# Patient Record
Sex: FEMALE | Race: WHITE | NOT HISPANIC OR LATINO
[De-identification: names, ages, dates, MRNs, and addresses within clinical notes are randomized per-mention and may not be internally consistent; named-entity substitution may affect disease eponyms.]

---

## 2021-05-14 PROBLEM — Z00.00 ENCOUNTER FOR PREVENTIVE HEALTH EXAMINATION: Status: ACTIVE | Noted: 2021-05-14

## 2021-05-20 ENCOUNTER — APPOINTMENT (OUTPATIENT)
Dept: BARIATRICS | Facility: CLINIC | Age: 36
End: 2021-05-20
Payer: COMMERCIAL

## 2021-05-20 VITALS — WEIGHT: 270 LBS | HEIGHT: 68 IN | BODY MASS INDEX: 40.92 KG/M2

## 2021-05-20 DIAGNOSIS — Z87.42 PERSONAL HISTORY OF OTHER DISEASES OF THE FEMALE GENITAL TRACT: ICD-10-CM

## 2021-05-20 DIAGNOSIS — Z85.850 PERSONAL HISTORY OF MALIGNANT NEOPLASM OF THYROID: ICD-10-CM

## 2021-05-20 DIAGNOSIS — Z87.898 PERSONAL HISTORY OF OTHER SPECIFIED CONDITIONS: ICD-10-CM

## 2021-05-20 DIAGNOSIS — Z83.3 FAMILY HISTORY OF DIABETES MELLITUS: ICD-10-CM

## 2021-05-20 DIAGNOSIS — F41.1 GENERALIZED ANXIETY DISORDER: ICD-10-CM

## 2021-05-20 DIAGNOSIS — G43.909 MIGRAINE, UNSPECIFIED, NOT INTRACTABLE, W/OUT STATUS MIGRAINOSUS: ICD-10-CM

## 2021-05-20 PROCEDURE — 99204 OFFICE O/P NEW MOD 45 MIN: CPT | Mod: 95

## 2021-05-20 RX ORDER — NADOLOL 80 MG/1
TABLET ORAL
Refills: 0 | Status: ACTIVE | COMMUNITY

## 2021-05-20 RX ORDER — LEVOTHYROXINE SODIUM 200 UG/1
200 TABLET ORAL
Refills: 0 | Status: ACTIVE | COMMUNITY

## 2021-05-20 RX ORDER — ALPRAZOLAM 2 MG/1
TABLET ORAL
Refills: 0 | Status: ACTIVE | COMMUNITY

## 2021-05-20 RX ORDER — METFORMIN ER 500 MG 500 MG/1
500 TABLET ORAL
Refills: 0 | Status: ACTIVE | COMMUNITY

## 2021-05-20 RX ORDER — PAROXETINE HYDROCHLORIDE 40 MG/1
TABLET, FILM COATED ORAL
Refills: 0 | Status: ACTIVE | COMMUNITY

## 2021-05-20 NOTE — HISTORY OF PRESENT ILLNESS
[FreeTextEntry1] : 36 year old female referral by Dr. Salas.\par Patient has been struggling with her weight for many years.  +PCOS on depo, MADHAV stable on paxil, prediabetes for years failed IR metformin due to GI side effects, s/p total thyroidectomy with GUTIERREZ undetectable thyroglobulin level.\par Two small children- 8 m and 6 yr with special needs\par Works as a  \par Does not cook- relies mostly on takeout and now her mother is helping \par Has recumbent bike at home, goes for walks 3x a week 15-30m\par Good water intake\par Poor sleep due to children and  being up at night\par Established care with Dr. Duarte earlier this week- starting on metformin ER at a low dose discussed possible Saxenda in the future\par Father had bariatric surgery and struggled before his death \par Reports recent HbA1C 6.4%

## 2021-05-20 NOTE — ASSESSMENT
[FreeTextEntry1] : 36 year old female with morbid obesity, pcos and prediabetes\par Discussed importance of low carb diet- reviewed complex carbs vs. simple carbs and healthy plate pattern of eating.  DIscussed 15g snack with protein if truly hungry.  \par Patient to continue metformin and consider GLP-1RA\par Discussed diagnostic criteria for diabetes and likelihood of her developing diabetes if her weight goes up\par Encouraged regular exercise\par Will refer to RD to discuss eating choices and [facility] program for cooking classes if currently offering d/t pandemic.  Advised to start tracking using myfitnesspal or [employer] prior to appointment if she has the time \par Discussed possibility of bariatric surgery in the future if patient does not see improvement with lifestyle changes she is making

## 2021-05-20 NOTE — REASON FOR VISIT
[Home] : at home, [unfilled] , at the time of the visit. [Other Location: e.g. Home (Enter Location, City,State)___] : at [unfilled] [Verbal consent obtained from patient] : the patient, [unfilled] [Initial Consultation] : an initial consultation for [Obesity] : obesity [Polycystic Ovary Syndrome] : polycystic ovary syndrome [FreeTextEntry2] : prediabetes

## 2021-06-17 ENCOUNTER — APPOINTMENT (OUTPATIENT)
Dept: INTERNAL MEDICINE | Facility: CLINIC | Age: 36
End: 2021-06-17
Payer: COMMERCIAL

## 2021-06-17 DIAGNOSIS — R73.03 PREDIABETES.: ICD-10-CM

## 2021-06-17 PROCEDURE — 97802 MEDICAL NUTRITION INDIV IN: CPT | Mod: 95

## 2021-06-17 RX ORDER — TIZANIDINE 4 MG/1
TABLET ORAL
Refills: 0 | Status: ACTIVE | COMMUNITY

## 2021-07-20 ENCOUNTER — APPOINTMENT (OUTPATIENT)
Dept: INTERNAL MEDICINE | Facility: CLINIC | Age: 36
End: 2021-07-20

## 2022-01-05 ENCOUNTER — TRANSCRIPTION ENCOUNTER (OUTPATIENT)
Age: 37
End: 2022-01-05

## 2022-03-10 ENCOUNTER — APPOINTMENT (OUTPATIENT)
Dept: PULMONOLOGY | Facility: CLINIC | Age: 37
End: 2022-03-10
Payer: COMMERCIAL

## 2022-03-10 VITALS
HEART RATE: 75 BPM | HEIGHT: 68 IN | BODY MASS INDEX: 40.92 KG/M2 | WEIGHT: 270 LBS | DIASTOLIC BLOOD PRESSURE: 70 MMHG | SYSTOLIC BLOOD PRESSURE: 118 MMHG

## 2022-03-10 DIAGNOSIS — G47.33 OBSTRUCTIVE SLEEP APNEA (ADULT) (PEDIATRIC): ICD-10-CM

## 2022-03-10 DIAGNOSIS — Z78.9 OTHER SPECIFIED HEALTH STATUS: ICD-10-CM

## 2022-03-10 PROCEDURE — 99203 OFFICE O/P NEW LOW 30 MIN: CPT

## 2022-03-10 RX ORDER — MEDROXYPROGESTERONE ACETATE 400 MG/ML
INJECTION, SUSPENSION INTRAMUSCULAR
Refills: 0 | Status: ACTIVE | COMMUNITY

## 2022-03-10 RX ORDER — ROSUVASTATIN CALCIUM 5 MG/1
TABLET, FILM COATED ORAL
Refills: 0 | Status: ACTIVE | COMMUNITY

## 2022-03-10 NOTE — HISTORY OF PRESENT ILLNESS
[FreeTextEntry1] : Dr. Salas\par 36 year old woman  with history of thyroid cancer in remission, anxiety, migraines is here in the sleep center to address sleep apnea.  Patient is sleepy with [place] sleepiness score of 12.  Patient has very loud snoring, also has witnessed apneas.  Patient's bedtime is around 8 PM wakes up in the morning around 6 AM.  She feels tired when she wakes up.  She is sleepy while driving.\par STOPBANG score - 5\par \par Patient underwent a sleep study which showed moderate sleep apnea with AHI of 20.  Discussed results with the patient today.\par

## 2022-03-10 NOTE — ASSESSMENT
[FreeTextEntry1] : 36 -year-old woman with history of moderate obstructive sleep apnea.\par \par Today we discussed the results in detail with the patient and the first option for this degree of sleep apnea with her anatomy will be a CPAP machine.\par \par But due to practical issues , patient has 2 kids at home who have special needs and because of that patient will not be able to use the CPAP therapy at this time.  Patient is also hesitant to use the CPAP.  She saw the CPAP being used at home by her father in the past.\par \par For now we will use oral appliance and see if it benefits the patient.

## 2022-06-02 ENCOUNTER — RESULT REVIEW (OUTPATIENT)
Age: 37
End: 2022-06-02

## 2023-03-27 ENCOUNTER — APPOINTMENT (OUTPATIENT)
Dept: PEDIATRIC ORTHOPEDIC SURGERY | Facility: CLINIC | Age: 38
End: 2023-03-27
Payer: COMMERCIAL

## 2023-03-27 VITALS — HEIGHT: 68 IN | WEIGHT: 270 LBS | BODY MASS INDEX: 40.92 KG/M2 | TEMPERATURE: 97.6 F

## 2023-03-27 DIAGNOSIS — E66.01 MORBID (SEVERE) OBESITY DUE TO EXCESS CALORIES: ICD-10-CM

## 2023-03-27 PROCEDURE — 20610 DRAIN/INJ JOINT/BURSA W/O US: CPT

## 2023-03-27 PROCEDURE — 99203 OFFICE O/P NEW LOW 30 MIN: CPT | Mod: 25

## 2023-03-27 PROCEDURE — 73521 X-RAY EXAM HIPS BI 2 VIEWS: CPT

## 2023-03-27 RX ORDER — SEMAGLUTIDE 1.34 MG/ML
2 INJECTION, SOLUTION SUBCUTANEOUS
Refills: 0 | Status: ACTIVE | COMMUNITY

## 2023-03-27 NOTE — PROCEDURE
[FreeTextEntry1] : To mL of 1% plain lidocaine and 1 mL of 40 mg of Depo-Medrol have been injected into the right greater trochanter without adverse reaction

## 2023-03-27 NOTE — PHYSICAL EXAM
[de-identified] : On physical examination her gait is normal.  The patient has marked tenderness in the region of the greater trochanter of the right hip.  Attempts at abduction of the hip against resistance increases her hip pain.  Internal rotation especially against resistance causes pain in her right groin. [de-identified] : X-ray evaluation of the hips on 3/27/2023 (AP and frog lateral views) reveals no obvious abnormalities.

## 2023-03-27 NOTE — ASSESSMENT
[FreeTextEntry1] : Rule out articular cartilage injury right hip\par Trochanteric bursitis right hip\par \par It is felt that this patient should try an intra-articular cortisone injection into the right hip under x-ray control.  We are seeking authorization for this procedure.

## 2023-03-27 NOTE — HISTORY OF PRESENT ILLNESS
[de-identified] : This 38-year-old female who is obese is here for evaluation of persistent right groin and right lateral hip pain since an accident she had in January 2022.  Patient has seen a few orthopedic surgeons who made a diagnosis of possible labral tear.  An MRI arthrogram revealed no obvious hip abnormalities.  Patient was sent for physical therapy which did not help at all.  She has come to this office for further opinion.

## 2023-07-06 ENCOUNTER — NON-APPOINTMENT (OUTPATIENT)
Age: 38
End: 2023-07-06

## 2023-07-13 ENCOUNTER — APPOINTMENT (OUTPATIENT)
Dept: PEDIATRIC ORTHOPEDIC SURGERY | Facility: CLINIC | Age: 38
End: 2023-07-13
Payer: COMMERCIAL

## 2023-07-13 VITALS
HEIGHT: 68 IN | BODY MASS INDEX: 40.92 KG/M2 | DIASTOLIC BLOOD PRESSURE: 86 MMHG | WEIGHT: 270 LBS | TEMPERATURE: 96.8 F | SYSTOLIC BLOOD PRESSURE: 119 MMHG

## 2023-07-13 DIAGNOSIS — M25.551 PAIN IN RIGHT HIP: ICD-10-CM

## 2023-07-13 DIAGNOSIS — M70.61 TROCHANTERIC BURSITIS, RIGHT HIP: ICD-10-CM

## 2023-07-13 DIAGNOSIS — M79.89 OTHER SPECIFIED SOFT TISSUE DISORDERS: ICD-10-CM

## 2023-07-13 DIAGNOSIS — S73.191A OTHER SPRAIN OF RIGHT HIP, INITIAL ENCOUNTER: ICD-10-CM

## 2023-07-13 PROCEDURE — 99213 OFFICE O/P EST LOW 20 MIN: CPT

## 2023-07-13 NOTE — REASON FOR VISIT
[FreeTextEntry1] : This 38-year-old female returns with continued complaints of right anterior hip pain.  Patient did have an MRI on 6/26/2023 which revealed a soft tissue mass in the region of the gluteus medius.  It also revealed a labral tear.  Patient thinks that the soft tissue mass in the buttock region may be secondary to her original accident.

## 2023-07-13 NOTE — PHYSICAL EXAM
[FreeTextEntry1] : On physical examination the patient does have pain on hip rotation.  There is mild tenderness in the region of the gluteus medius but a soft tissue mass is not palpable.  Straight leg raising test is negative.  Motor or sensory and deep tendon reflex examination of the right lower extremity is intact.

## 2023-07-13 NOTE — DISCUSSION/SUMMARY
[de-identified] : Assessment:\par Soft tissue mass right gluteus medius\par Right hip labral tear\par \par Because of the MRI findings I have suggested that the patient get a consultation from an orthopedic oncologist to determine the nature of the soft tissue lesion.  She has been advised that she may need arthroscopy of the hip for the labral tear.  Since she does have physicians at [facility] she will seek an opinion from an orthopedic oncologist in that institution.

## 2023-09-06 ENCOUNTER — OFFICE (OUTPATIENT)
Dept: URBAN - METROPOLITAN AREA CLINIC 29 | Facility: CLINIC | Age: 38
Setting detail: OPHTHALMOLOGY
End: 2023-09-06
Payer: COMMERCIAL

## 2023-09-06 DIAGNOSIS — G43.809: ICD-10-CM

## 2023-09-06 PROCEDURE — 99213 OFFICE O/P EST LOW 20 MIN: CPT | Performed by: OPHTHALMOLOGY

## 2023-09-06 ASSESSMENT — REFRACTION_AUTOREFRACTION
OS_CYLINDER: +1.00
OS_AXIS: 057
OS_SPHERE: -3.75
OD_CYLINDER: +2.00
OD_SPHERE: -3.50
OD_AXIS: 158

## 2023-09-06 ASSESSMENT — REFRACTION_CURRENTRX
OD_CYLINDER: +1.00
OS_OVR_VA: 20/
OS_AXIS: 001
OS_CYLINDER: +0.50
OS_SPHERE: -2.50
OD_SPHERE: -2.50
OD_OVR_VA: 20/
OD_AXIS: 175

## 2023-09-06 ASSESSMENT — SPHEQUIV_DERIVED
OD_SPHEQUIV: -2.5
OS_SPHEQUIV: -3.25

## 2023-09-06 ASSESSMENT — TONOMETRY
OD_IOP_MMHG: 18
OS_IOP_MMHG: 16

## 2023-09-06 ASSESSMENT — VISUAL ACUITY
OS_BCVA: 20/20-3
OD_BCVA: 20/25+

## 2023-09-06 ASSESSMENT — CONFRONTATIONAL VISUAL FIELD TEST (CVF)
OS_FINDINGS: FULL
OD_FINDINGS: FULL

## 2023-09-13 ENCOUNTER — APPOINTMENT (OUTPATIENT)
Dept: PEDIATRIC ORTHOPEDIC SURGERY | Facility: CLINIC | Age: 38
End: 2023-09-13

## 2023-10-17 ENCOUNTER — APPOINTMENT (OUTPATIENT)
Dept: PEDIATRIC ORTHOPEDIC SURGERY | Facility: CLINIC | Age: 38
End: 2023-10-17
Payer: COMMERCIAL

## 2023-10-17 VITALS — HEIGHT: 68 IN | WEIGHT: 270 LBS | TEMPERATURE: 97 F | BODY MASS INDEX: 40.92 KG/M2

## 2023-10-17 PROCEDURE — 99213 OFFICE O/P EST LOW 20 MIN: CPT

## 2023-10-17 PROCEDURE — 73030 X-RAY EXAM OF SHOULDER: CPT

## 2023-10-17 RX ORDER — KETOROLAC TROMETHAMINE 10 MG/1
10 TABLET, FILM COATED ORAL 3 TIMES DAILY
Qty: 12 | Refills: 0 | Status: ACTIVE | COMMUNITY
Start: 2023-10-17 | End: 1900-01-01

## 2023-10-19 ENCOUNTER — TRANSCRIPTION ENCOUNTER (OUTPATIENT)
Age: 38
End: 2023-10-19

## 2023-10-27 ENCOUNTER — APPOINTMENT (OUTPATIENT)
Dept: PEDIATRIC ORTHOPEDIC SURGERY | Facility: CLINIC | Age: 38
End: 2023-10-27
Payer: COMMERCIAL

## 2023-10-27 VITALS
HEIGHT: 68 IN | BODY MASS INDEX: 40.92 KG/M2 | WEIGHT: 270 LBS | DIASTOLIC BLOOD PRESSURE: 80 MMHG | TEMPERATURE: 97.6 F | SYSTOLIC BLOOD PRESSURE: 120 MMHG

## 2023-10-27 DIAGNOSIS — M75.22 BICIPITAL TENDINITIS, LEFT SHOULDER: ICD-10-CM

## 2023-10-27 PROCEDURE — 20610 DRAIN/INJ JOINT/BURSA W/O US: CPT

## 2023-10-27 PROCEDURE — 99213 OFFICE O/P EST LOW 20 MIN: CPT | Mod: 25

## 2023-10-27 RX ORDER — MELOXICAM 15 MG/1
15 TABLET ORAL
Qty: 30 | Refills: 1 | Status: ACTIVE | COMMUNITY
Start: 2023-10-27 | End: 1900-01-01

## 2024-03-26 ENCOUNTER — NON-APPOINTMENT (OUTPATIENT)
Age: 39
End: 2024-03-26

## 2024-08-01 ENCOUNTER — NON-APPOINTMENT (OUTPATIENT)
Age: 39
End: 2024-08-01

## 2024-09-19 ENCOUNTER — NON-APPOINTMENT (OUTPATIENT)
Age: 39
End: 2024-09-19

## 2024-09-26 ENCOUNTER — OFFICE (OUTPATIENT)
Dept: URBAN - METROPOLITAN AREA CLINIC 29 | Facility: CLINIC | Age: 39
Setting detail: OPHTHALMOLOGY
End: 2024-09-26
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
## 2024-09-26 PROCEDURE — NO SHOW FE NO SHOW FEE: Performed by: OPHTHALMOLOGY

## 2024-10-04 ENCOUNTER — NON-APPOINTMENT (OUTPATIENT)
Age: 39
End: 2024-10-04

## 2025-03-09 ENCOUNTER — NON-APPOINTMENT (OUTPATIENT)
Age: 40
End: 2025-03-09

## 2025-04-01 ENCOUNTER — APPOINTMENT (OUTPATIENT)
Dept: NEUROSURGERY | Facility: CLINIC | Age: 40
End: 2025-04-01
Payer: COMMERCIAL

## 2025-04-01 DIAGNOSIS — I67.1 CEREBRAL ANEURYSM, NONRUPTURED: ICD-10-CM

## 2025-04-01 PROCEDURE — G2211 COMPLEX E/M VISIT ADD ON: CPT | Mod: NC

## 2025-04-01 PROCEDURE — 99205 OFFICE O/P NEW HI 60 MIN: CPT

## 2025-04-21 ENCOUNTER — RESULT REVIEW (OUTPATIENT)
Age: 40
End: 2025-04-21

## 2025-04-22 ENCOUNTER — APPOINTMENT (OUTPATIENT)
Dept: NEUROSURGERY | Facility: CLINIC | Age: 40
End: 2025-04-22
Payer: COMMERCIAL

## 2025-04-22 PROCEDURE — G2211 COMPLEX E/M VISIT ADD ON: CPT | Mod: NC,93,93,93

## 2025-04-22 PROCEDURE — 99215 OFFICE O/P EST HI 40 MIN: CPT | Mod: 3W

## 2025-05-07 ENCOUNTER — NON-APPOINTMENT (OUTPATIENT)
Age: 40
End: 2025-05-07